# Patient Record
Sex: MALE | Race: BLACK OR AFRICAN AMERICAN | Employment: OTHER | URBAN - METROPOLITAN AREA
[De-identification: names, ages, dates, MRNs, and addresses within clinical notes are randomized per-mention and may not be internally consistent; named-entity substitution may affect disease eponyms.]

---

## 2017-03-16 ENCOUNTER — OFFICE VISIT (OUTPATIENT)
Dept: INTERNAL MEDICINE | Age: 27
End: 2017-03-16
Payer: MEDICARE

## 2017-03-16 VITALS
WEIGHT: 315 LBS | OXYGEN SATURATION: 94 % | HEIGHT: 74 IN | SYSTOLIC BLOOD PRESSURE: 155 MMHG | DIASTOLIC BLOOD PRESSURE: 94 MMHG | BODY MASS INDEX: 40.43 KG/M2 | HEART RATE: 75 BPM

## 2017-03-16 DIAGNOSIS — I10 ESSENTIAL HYPERTENSION: Primary | ICD-10-CM

## 2017-03-16 DIAGNOSIS — Z00.00 HEALTHCARE MAINTENANCE: ICD-10-CM

## 2017-03-16 PROCEDURE — 99204 OFFICE O/P NEW MOD 45 MIN: CPT | Performed by: NURSE PRACTITIONER

## 2017-03-16 RX ORDER — AMLODIPINE BESYLATE 5 MG/1
5 TABLET ORAL DAILY
Qty: 30 TABLET | Refills: 3 | Status: SHIPPED | OUTPATIENT
Start: 2017-03-16 | End: 2019-02-27 | Stop reason: SDUPTHER

## 2017-03-16 RX ORDER — AMLODIPINE BESYLATE 5 MG/1
5 TABLET ORAL DAILY
Qty: 30 TABLET | Refills: 3 | Status: SHIPPED | OUTPATIENT
Start: 2017-03-16 | End: 2017-03-16 | Stop reason: CLARIF

## 2017-03-16 ASSESSMENT — ENCOUNTER SYMPTOMS
SHORTNESS OF BREATH: 0
BLURRED VISION: 1
NAUSEA: 0
ORTHOPNEA: 0
ABDOMINAL PAIN: 0
SINUS PRESSURE: 0

## 2017-03-16 ASSESSMENT — PATIENT HEALTH QUESTIONNAIRE - PHQ9
2. FEELING DOWN, DEPRESSED OR HOPELESS: 0
1. LITTLE INTEREST OR PLEASURE IN DOING THINGS: 0
SUM OF ALL RESPONSES TO PHQ QUESTIONS 1-9: 0
SUM OF ALL RESPONSES TO PHQ9 QUESTIONS 1 & 2: 0

## 2017-04-13 ENCOUNTER — TELEPHONE (OUTPATIENT)
Dept: INTERNAL MEDICINE | Age: 27
End: 2017-04-13

## 2019-02-27 ENCOUNTER — OFFICE VISIT (OUTPATIENT)
Dept: PRIMARY CARE CLINIC | Age: 29
End: 2019-02-27
Payer: MEDICARE

## 2019-02-27 ENCOUNTER — HOSPITAL ENCOUNTER (OUTPATIENT)
Age: 29
Discharge: HOME | End: 2019-02-27
Payer: MEDICARE

## 2019-02-27 VITALS
SYSTOLIC BLOOD PRESSURE: 151 MMHG | DIASTOLIC BLOOD PRESSURE: 104 MMHG | HEART RATE: 83 BPM | TEMPERATURE: 97.5 F | OXYGEN SATURATION: 99 % | HEIGHT: 74 IN | BODY MASS INDEX: 40.43 KG/M2 | WEIGHT: 315 LBS

## 2019-02-27 DIAGNOSIS — G62.9 NEUROPATHY: Primary | ICD-10-CM

## 2019-02-27 DIAGNOSIS — I10 ESSENTIAL HYPERTENSION: ICD-10-CM

## 2019-02-27 DIAGNOSIS — G47.19 EXCESSIVE DAYTIME SLEEPINESS: ICD-10-CM

## 2019-02-27 DIAGNOSIS — E66.01 MORBID OBESITY WITH BMI OF 60.0-69.9, ADULT (HCC): ICD-10-CM

## 2019-02-27 DIAGNOSIS — Z11.4 SCREENING FOR HIV (HUMAN IMMUNODEFICIENCY VIRUS): ICD-10-CM

## 2019-02-27 DIAGNOSIS — G62.9 NEUROPATHY: ICD-10-CM

## 2019-02-27 LAB
ALBUMIN SERPL-MCNC: 4.1 G/DL (ref 3.5–5.2)
ALBUMIN/GLOBULIN RATIO: 1 (ref 1–2.5)
ALP BLD-CCNC: 72 U/L (ref 40–129)
ALT SERPL-CCNC: 28 U/L (ref 5–41)
ANION GAP SERPL CALCULATED.3IONS-SCNC: 11 MMOL/L (ref 9–17)
AST SERPL-CCNC: 29 U/L
BILIRUB SERPL-MCNC: 0.61 MG/DL (ref 0.3–1.2)
BUN BLDV-MCNC: 10 MG/DL (ref 6–20)
BUN/CREAT BLD: ABNORMAL (ref 9–20)
CALCIUM SERPL-MCNC: 9 MG/DL (ref 8.6–10.4)
CHLORIDE BLD-SCNC: 107 MMOL/L (ref 98–107)
CO2: 26 MMOL/L (ref 20–31)
CREAT SERPL-MCNC: 0.64 MG/DL (ref 0.7–1.2)
GFR AFRICAN AMERICAN: >60 ML/MIN
GFR NON-AFRICAN AMERICAN: >60 ML/MIN
GFR SERPL CREATININE-BSD FRML MDRD: ABNORMAL ML/MIN/{1.73_M2}
GFR SERPL CREATININE-BSD FRML MDRD: ABNORMAL ML/MIN/{1.73_M2}
GLUCOSE FASTING: 102 MG/DL (ref 70–99)
HBA1C MFR BLD: 5.8 %
HIV AG/AB: NONREACTIVE
POTASSIUM SERPL-SCNC: 4.2 MMOL/L (ref 3.7–5.3)
SODIUM BLD-SCNC: 144 MMOL/L (ref 135–144)
TOTAL PROTEIN: 8.1 G/DL (ref 6.4–8.3)

## 2019-02-27 PROCEDURE — 36415 COLL VENOUS BLD VENIPUNCTURE: CPT

## 2019-02-27 PROCEDURE — 87389 HIV-1 AG W/HIV-1&-2 AB AG IA: CPT

## 2019-02-27 PROCEDURE — 99214 OFFICE O/P EST MOD 30 MIN: CPT | Performed by: NURSE PRACTITIONER

## 2019-02-27 PROCEDURE — G8427 DOCREV CUR MEDS BY ELIG CLIN: HCPCS | Performed by: NURSE PRACTITIONER

## 2019-02-27 PROCEDURE — 1036F TOBACCO NON-USER: CPT | Performed by: NURSE PRACTITIONER

## 2019-02-27 PROCEDURE — 80053 COMPREHEN METABOLIC PANEL: CPT

## 2019-02-27 PROCEDURE — 83036 HEMOGLOBIN GLYCOSYLATED A1C: CPT | Performed by: NURSE PRACTITIONER

## 2019-02-27 PROCEDURE — G8484 FLU IMMUNIZE NO ADMIN: HCPCS | Performed by: NURSE PRACTITIONER

## 2019-02-27 PROCEDURE — G8417 CALC BMI ABV UP PARAM F/U: HCPCS | Performed by: NURSE PRACTITIONER

## 2019-02-27 RX ORDER — AMLODIPINE BESYLATE 5 MG/1
5 TABLET ORAL DAILY
Qty: 30 TABLET | Refills: 1 | Status: SHIPPED | OUTPATIENT
Start: 2019-02-27 | End: 2019-03-27

## 2019-02-27 ASSESSMENT — ENCOUNTER SYMPTOMS
ABDOMINAL PAIN: 0
COUGH: 0
VOMITING: 0
ORTHOPNEA: 0
BLURRED VISION: 0
APNEA: 0
TROUBLE SWALLOWING: 0
SHORTNESS OF BREATH: 0
BLOOD IN STOOL: 0
DIARRHEA: 0
WHEEZING: 0

## 2019-02-27 ASSESSMENT — PATIENT HEALTH QUESTIONNAIRE - PHQ9
SUM OF ALL RESPONSES TO PHQ9 QUESTIONS 1 & 2: 0
SUM OF ALL RESPONSES TO PHQ QUESTIONS 1-9: 0
2. FEELING DOWN, DEPRESSED OR HOPELESS: 0
SUM OF ALL RESPONSES TO PHQ QUESTIONS 1-9: 0
1. LITTLE INTEREST OR PLEASURE IN DOING THINGS: 0

## 2019-03-27 ENCOUNTER — OFFICE VISIT (OUTPATIENT)
Dept: PRIMARY CARE CLINIC | Age: 29
End: 2019-03-27
Payer: MEDICARE

## 2019-03-27 VITALS
WEIGHT: 315 LBS | HEART RATE: 76 BPM | DIASTOLIC BLOOD PRESSURE: 93 MMHG | SYSTOLIC BLOOD PRESSURE: 143 MMHG | BODY MASS INDEX: 62.4 KG/M2 | OXYGEN SATURATION: 97 % | TEMPERATURE: 98.2 F

## 2019-03-27 DIAGNOSIS — I10 ESSENTIAL HYPERTENSION: Primary | ICD-10-CM

## 2019-03-27 DIAGNOSIS — E66.01 MORBID OBESITY WITH BMI OF 60.0-69.9, ADULT (HCC): ICD-10-CM

## 2019-03-27 PROCEDURE — G8484 FLU IMMUNIZE NO ADMIN: HCPCS | Performed by: NURSE PRACTITIONER

## 2019-03-27 PROCEDURE — G8427 DOCREV CUR MEDS BY ELIG CLIN: HCPCS | Performed by: NURSE PRACTITIONER

## 2019-03-27 PROCEDURE — 99213 OFFICE O/P EST LOW 20 MIN: CPT | Performed by: NURSE PRACTITIONER

## 2019-03-27 PROCEDURE — 1036F TOBACCO NON-USER: CPT | Performed by: NURSE PRACTITIONER

## 2019-03-27 PROCEDURE — G8417 CALC BMI ABV UP PARAM F/U: HCPCS | Performed by: NURSE PRACTITIONER

## 2019-03-27 RX ORDER — AMLODIPINE BESYLATE 10 MG/1
10 TABLET ORAL DAILY
Qty: 90 TABLET | Refills: 1 | Status: SHIPPED | OUTPATIENT
Start: 2019-03-27 | End: 2020-09-08

## 2019-03-27 ASSESSMENT — ENCOUNTER SYMPTOMS
ORTHOPNEA: 0
BLURRED VISION: 0
SHORTNESS OF BREATH: 0

## 2019-07-26 ENCOUNTER — TELEPHONE (OUTPATIENT)
Dept: PRIMARY CARE CLINIC | Age: 29
End: 2019-07-26

## 2019-07-26 ENCOUNTER — OFFICE VISIT (OUTPATIENT)
Dept: PRIMARY CARE CLINIC | Age: 29
End: 2019-07-26
Payer: MEDICARE

## 2019-07-26 VITALS
BODY MASS INDEX: 61.89 KG/M2 | WEIGHT: 315 LBS | HEART RATE: 67 BPM | SYSTOLIC BLOOD PRESSURE: 133 MMHG | DIASTOLIC BLOOD PRESSURE: 91 MMHG | OXYGEN SATURATION: 97 %

## 2019-07-26 DIAGNOSIS — H66.001 NON-RECURRENT ACUTE SUPPURATIVE OTITIS MEDIA OF RIGHT EAR WITHOUT SPONTANEOUS RUPTURE OF TYMPANIC MEMBRANE: Primary | ICD-10-CM

## 2019-07-26 DIAGNOSIS — H60.311 DIFFUSE OTITIS EXTERNA OF RIGHT EAR, UNSPECIFIED CHRONICITY: ICD-10-CM

## 2019-07-26 PROCEDURE — G8427 DOCREV CUR MEDS BY ELIG CLIN: HCPCS | Performed by: NURSE PRACTITIONER

## 2019-07-26 PROCEDURE — 1036F TOBACCO NON-USER: CPT | Performed by: NURSE PRACTITIONER

## 2019-07-26 PROCEDURE — 99213 OFFICE O/P EST LOW 20 MIN: CPT | Performed by: NURSE PRACTITIONER

## 2019-07-26 PROCEDURE — 4130F TOPICAL PREP RX AOE: CPT | Performed by: NURSE PRACTITIONER

## 2019-07-26 PROCEDURE — G8417 CALC BMI ABV UP PARAM F/U: HCPCS | Performed by: NURSE PRACTITIONER

## 2019-07-26 RX ORDER — AMOXICILLIN AND CLAVULANATE POTASSIUM 875; 125 MG/1; MG/1
1 TABLET, FILM COATED ORAL 2 TIMES DAILY
Qty: 14 TABLET | Refills: 0 | Status: SHIPPED | OUTPATIENT
Start: 2019-07-26 | End: 2019-07-26

## 2019-07-26 RX ORDER — AMOXICILLIN 400 MG/5ML
800 POWDER, FOR SUSPENSION ORAL 2 TIMES DAILY
Qty: 200 ML | Refills: 0 | Status: SHIPPED | OUTPATIENT
Start: 2019-07-26 | End: 2019-08-05

## 2019-07-26 RX ORDER — OFLOXACIN 3 MG/ML
5 SOLUTION AURICULAR (OTIC) 2 TIMES DAILY
Qty: 10 ML | Refills: 0 | Status: SHIPPED | OUTPATIENT
Start: 2019-07-26 | End: 2019-07-31

## 2019-07-26 ASSESSMENT — ENCOUNTER SYMPTOMS
EYE DISCHARGE: 0
COUGH: 0
SHORTNESS OF BREATH: 0
EYE ITCHING: 0
SINUS PAIN: 0
SORE THROAT: 0

## 2019-07-26 NOTE — PROGRESS NOTES
[facility] [address] [facility]  [facility] [address]  Dept: [phone]  Dept Fax: [phone]    Ruby Eng is a 33 y.o. male who presents to the urgent care today for his medicalconditions/complaints as noted below. Ruby Eng is c/o of Otalgia (right side for about 2 days)      HPI:       28-year-old male patient presents with complaint of patient describes symptoms started approximately 3 to 4 days ago and have been gradually worsening. Patient describes the pain as dull, aching, feeling clogged. Patient reports he did go swimming last week. Denies pain with traction of the ear. Denies any discharge or bleeding. Additionally patient denies congestion, sore throat, sinus pain, cough. Denies fevers, chills. Relieving factors include none. Worsening factors include none. Treatments tried include none. Past Medical History:   Diagnosis Date    Hypertension 2017    Obesity         Current Outpatient Medications   Medication Sig Dispense Refill    ofloxacin (FLOXIN) 0.3 % otic solution Place 5 drops into the right ear 2 times daily for 5 days 10 mL 0    amoxicillin-clavulanate (AUGMENTIN) 875-125 MG per tablet Take 1 tablet by mouth 2 times daily for 7 days 14 tablet 0    amLODIPine (NORVASC) 10 MG tablet Take 1 tablet by mouth daily 90 tablet 1     No current facility-administered medications for this visit. No Known Allergies    Subjective:      Review of Systems   Constitutional: Negative for chills and fever. HENT: Positive for ear pain. Negative for congestion, ear discharge, sinus pain and sore throat. Eyes: Negative for discharge and itching. Respiratory: Negative for cough and shortness of breath. All other systems reviewed and are negative. Objective:     Physical Exam   Constitutional: He is oriented to person, place, and time. He appears well-developed and well-nourished.    HENT:   Head: Atraumatic. Right Ear: There is drainage and swelling. Tympanic membrane is injected and erythematous. A middle ear effusion is present. Left Ear: Ear canal normal. Tympanic membrane is injected. Nose: No mucosal edema or rhinorrhea. Right sinus exhibits no maxillary sinus tenderness and no frontal sinus tenderness. Left sinus exhibits no maxillary sinus tenderness and no frontal sinus tenderness. Mouth/Throat: Uvula is midline and oropharynx is clear and moist.   Cardiovascular: Normal rate. Pulmonary/Chest: Effort normal and breath sounds normal.   Neurological: He is alert and oriented to person, place, and time. Skin: Skin is warm and dry. Psychiatric: His behavior is normal.   Nursing note and vitals reviewed. BP (!) 133/91 (Site: Right Lower Arm, Position: Sitting, Cuff Size: Large Adult)   Pulse 67   Wt (!) 482 lb (218.6 kg)   SpO2 97%   BMI 61.89 kg/m²   Lab Review   No visits with results within 2 Month(s) from this visit. Latest known visit with results is:   Hospital Outpatient Visit on 02/27/2019   Component Date Value    HIV Ag/Ab 02/27/2019 NONREACTIVE     Glucose, Fasting 02/27/2019 102*    BUN 02/27/2019 10     CREATININE 02/27/2019 0.64*    Bun/Cre Ratio 02/27/2019 NOT REPORTED     Calcium 02/27/2019 9.0     Sodium 02/27/2019 144     Potassium 02/27/2019 4.2     Chloride 02/27/2019 107     CO2 02/27/2019 26     Anion Gap 02/27/2019 11     Alkaline Phosphatase 02/27/2019 72     ALT 02/27/2019 28     AST 02/27/2019 29     Total Bilirubin 02/27/2019 0.61     Total Protein 02/27/2019 8.1     Alb 02/27/2019 4.1     Albumin/Globulin Ratio 02/27/2019 1.0     GFR Non- 02/27/2019 >60     GFR  02/27/2019 >60     GFR Comment 02/27/2019          GFR Staging 02/27/2019 NOT REPORTED        Assessment:       Diagnosis Orders   1.  Non-recurrent acute suppurative otitis media of right ear without spontaneous rupture of tympanic membrane

## 2019-07-26 NOTE — PATIENT INSTRUCTIONS

## 2020-09-08 PROBLEM — E66.813 CLASS 3 SEVERE OBESITY DUE TO EXCESS CALORIES WITH SERIOUS COMORBIDITY AND BODY MASS INDEX (BMI) OF 60.0 TO 69.9 IN ADULT: Status: ACTIVE | Noted: 2020-09-08

## 2020-09-08 PROBLEM — I10 ESSENTIAL HYPERTENSION: Status: ACTIVE | Noted: 2020-09-08

## 2020-09-08 PROBLEM — E66.01 CLASS 3 SEVERE OBESITY DUE TO EXCESS CALORIES WITH SERIOUS COMORBIDITY AND BODY MASS INDEX (BMI) OF 60.0 TO 69.9 IN ADULT (HCC): Status: ACTIVE | Noted: 2020-09-08

## 2022-10-25 ENCOUNTER — HOSPITAL ENCOUNTER (EMERGENCY)
Age: 32
Discharge: HOME | End: 2022-10-25
Attending: EMERGENCY MEDICINE
Payer: MEDICARE

## 2022-10-25 ENCOUNTER — APPOINTMENT (OUTPATIENT)
Dept: GENERAL RADIOLOGY | Age: 32
End: 2022-10-25
Payer: MEDICARE

## 2022-10-25 VITALS
HEART RATE: 71 BPM | OXYGEN SATURATION: 97 % | HEIGHT: 74 IN | TEMPERATURE: 98.2 F | DIASTOLIC BLOOD PRESSURE: 111 MMHG | RESPIRATION RATE: 16 BRPM | SYSTOLIC BLOOD PRESSURE: 174 MMHG | BODY MASS INDEX: 40.43 KG/M2 | WEIGHT: 315 LBS
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
## 2022-10-25 DIAGNOSIS — S83.91XA SPRAIN OF RIGHT LOWER LEG, INITIAL ENCOUNTER: ICD-10-CM

## 2022-10-25 PROCEDURE — 73610 X-RAY EXAM OF ANKLE: CPT

## 2022-10-25 PROCEDURE — 73630 X-RAY EXAM OF FOOT: CPT

## 2022-10-25 PROCEDURE — 99283 EMERGENCY DEPT VISIT LOW MDM: CPT

## 2022-10-25 PROCEDURE — 73590 X-RAY EXAM OF LOWER LEG: CPT

## 2022-10-25 ASSESSMENT — PAIN - FUNCTIONAL ASSESSMENT: PAIN_FUNCTIONAL_ASSESSMENT: 0-10

## 2022-10-25 ASSESSMENT — PAIN SCALES - GENERAL: PAINLEVEL_OUTOF10: 7
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
## 2022-10-25 NOTE — Clinical Note
Stephanie Flores was seen and treated in our emergency department on 10/25/2022. He may return to work on 10/26/2022. If you have any questions or concerns, please don't hesitate to call.       Johnnie Gusman MD
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
## 2022-11-19 ENCOUNTER — HOSPITAL ENCOUNTER (EMERGENCY)
Age: 32
Discharge: HOME | End: 2022-11-19
Attending: EMERGENCY MEDICINE
Payer: MEDICARE

## 2022-11-19 ENCOUNTER — APPOINTMENT (OUTPATIENT)
Dept: GENERAL RADIOLOGY | Age: 32
End: 2022-11-19
Payer: MEDICARE

## 2022-11-19 VITALS
HEIGHT: 74 IN | HEART RATE: 69 BPM | OXYGEN SATURATION: 97 % | TEMPERATURE: 98.1 F | DIASTOLIC BLOOD PRESSURE: 121 MMHG | SYSTOLIC BLOOD PRESSURE: 161 MMHG | WEIGHT: 315 LBS | BODY MASS INDEX: 40.43 KG/M2 | RESPIRATION RATE: 14 BRPM

## 2022-11-19 DIAGNOSIS — M54.50 ACUTE RIGHT-SIDED LOW BACK PAIN WITHOUT SCIATICA: Primary | ICD-10-CM

## 2022-11-19 LAB
BILIRUBIN URINE: NEGATIVE
COLOR: YELLOW
COMMENT UA: ABNORMAL
GLUCOSE URINE: NEGATIVE
KETONES, URINE: NEGATIVE
LEUKOCYTE ESTERASE, URINE: NEGATIVE
NITRITE, URINE: NEGATIVE
PH UA: 6 (ref 5–8)
PROTEIN UA: NEGATIVE
SPECIFIC GRAVITY UA: 1.04 (ref 1–1.03)
TURBIDITY: CLEAR
URINE HGB: NEGATIVE
UROBILINOGEN, URINE: NORMAL

## 2022-11-19 PROCEDURE — 72100 X-RAY EXAM L-S SPINE 2/3 VWS: CPT

## 2022-11-19 PROCEDURE — 81003 URINALYSIS AUTO W/O SCOPE: CPT

## 2022-11-19 PROCEDURE — 99284 EMERGENCY DEPT VISIT MOD MDM: CPT

## 2022-11-19 RX ORDER — ACETAMINOPHEN AND CODEINE PHOSPHATE 300; 30 MG/1; MG/1
1 TABLET ORAL EVERY 6 HOURS PRN
Qty: 20 TABLET | Refills: 0 | Status: SHIPPED | OUTPATIENT
Start: 2022-11-19 | End: 2022-11-24

## 2022-11-19 RX ORDER — CYCLOBENZAPRINE HCL 10 MG
10 TABLET ORAL EVERY 8 HOURS PRN
Qty: 20 TABLET | Refills: 0 | Status: SHIPPED | OUTPATIENT
Start: 2022-11-19 | End: 2022-11-30

## 2022-11-19 RX ORDER — IBUPROFEN 800 MG/1
800 TABLET ORAL EVERY 8 HOURS PRN
Qty: 20 TABLET | Refills: 0 | Status: SHIPPED | OUTPATIENT
Start: 2022-11-19

## 2022-11-19 ASSESSMENT — PAIN - FUNCTIONAL ASSESSMENT: PAIN_FUNCTIONAL_ASSESSMENT: 0-10

## 2022-11-19 ASSESSMENT — ENCOUNTER SYMPTOMS
BACK PAIN: 1
VOMITING: 0
COLOR CHANGE: 0
DIARRHEA: 0
SHORTNESS OF BREATH: 0
EYE REDNESS: 0
FACIAL SWELLING: 0
COUGH: 0
EYE DISCHARGE: 0
ABDOMINAL PAIN: 0
CONSTIPATION: 0

## 2022-11-19 ASSESSMENT — PAIN SCALES - GENERAL: PAINLEVEL_OUTOF10: 8

## 2022-11-19 NOTE — ED PROVIDER NOTES
[address] [place] [address] ED  EMERGENCY DEPARTMENT ENCOUNTER      Pt Name: Linsey Hackett  MRN: [id number]  [place] 1990  Date of evaluation: 11/19/2022  Provider: Devika Clark MD    [address] [place] [address]       Chief Complaint   Patient presents with    Back Pain    Hip Pain         HISTORY OF PRESENT ILLNESS  (Location/Symptom, Timing/Onset, Context/Setting, Quality, Duration, Modifying Factors, Severity.)   Linsey Hackett is a 33 y.o. male who presents to the emergency department for right lower back pain that goes into his groin area. No trauma and he has had this for several days. He states his urine smells strong. No hematuria or injury. He rated the pain as an 8. He admits he does not take his blood pressure medicine regularly and has not taken it for few days. Nursing Notes were reviewed. ALLERGIES     Patient has no known allergies. CURRENT MEDICATIONS       Previous Medications    ASPIRIN LOW DOSE 81 MG EC TABLET    TAKE 1 TABLET BY MOUTH EVERY DAY    METOPROLOL SUCCINATE (TOPROL XL) 50 MG EXTENDED RELEASE TABLET    TAKE 1 TABLET BY MOUTH EVERY DAY       PAST MEDICAL HISTORY         Diagnosis Date    Hypertension 2017    Obesity        SURGICAL HISTORY     History reviewed. No pertinent surgical history. FAMILY HISTORY           Problem Relation Age of Onset    High Blood Pressure Mother     Hypertension Maternal Grandmother     Diabetes Maternal Grandmother     Diabetes Paternal Grandmother     High Blood Pressure Paternal Grandmother      Family Status   Relation Name Status    Mother  Alive    MGM  (Not Specified)    PGM  (Not Specified)        SOCIAL HISTORY      reports that he has never smoked. He has never used smokeless tobacco. He reports that he does not drink alcohol and does not use drugs. REVIEW OF SYSTEMS    (2-9 systems for level 4, 10 or more for level 5)     Review of Systems   Constitutional:  Negative for chills, fatigue and fever.    HENT:  Negative for congestion, ear discharge and facial swelling. Eyes:  Negative for discharge and redness. Respiratory:  Negative for cough and shortness of breath. Cardiovascular:  Negative for chest pain. Gastrointestinal:  Negative for abdominal pain, constipation, diarrhea and vomiting. Genitourinary:  Negative for dysuria and hematuria. Musculoskeletal:  Positive for back pain. Negative for arthralgias. Skin:  Negative for color change and rash. Neurological:  Negative for syncope, numbness and headaches. Hematological:  Negative for adenopathy. Psychiatric/Behavioral:  Negative for confusion. The patient is not nervous/anxious. Except as noted above the remainder of the review of systems was reviewed and negative. PHYSICAL EXAM    (up to 7 for level 4, 8 or more for level 5)     Vitals:    11/19/22 0502   BP: (!) 161/121   Pulse: 69   Resp: 14   Temp: 98.1 °F (36.7 °C)   TempSrc: Oral   SpO2: 97%   Weight: (!) 400 lb (181.4 kg)   Height: 6' 2\" (1.88 m)       Physical Exam  Vitals reviewed. Constitutional:       General: He is not in acute distress. Appearance: He is well-developed. He is not diaphoretic. Comments: He is morbidly obese. HENT:      Head: Normocephalic and atraumatic. Eyes:      General: No scleral icterus. Right eye: No discharge. Left eye: No discharge. Cardiovascular:      Rate and Rhythm: Normal rate and regular rhythm. Pulmonary:      Effort: Pulmonary effort is normal. No respiratory distress. Breath sounds: Normal breath sounds. No stridor. No wheezing or rales. Abdominal:      General: There is no distension. Palpations: Abdomen is soft. Tenderness: There is no abdominal tenderness. Musculoskeletal:      Cervical back: Neck supple. Comments: Back has no bruise or rash. Lymphadenopathy:      Cervical: No cervical adenopathy. Skin:     General: Skin is warm and dry. Findings: No erythema or rash.    Neurological:      Mental Status: He is alert and oriented to person, place, and time. Psychiatric:         Behavior: Behavior normal.           DIAGNOSTIC RESULTS     EKG: All EKG's are interpreted by the Emergency Department Physician who either signs or Co-signs this chart in the absence of a cardiologist.    Not indicated    RADIOLOGY:   Non-plain film images such as CT, Ultrasound and MRI are read by the radiologist. Plain radiographic images are visualized and preliminarily interpreted by the emergency physician with the below findings:    Interpretation per the Radiologist below, if available at the time of this note:    XR LUMBAR SPINE (2-3 VIEWS)    Result Date: 11/19/2022  EXAMINATION: 3 XRAY VIEWS OF THE LUMBAR SPINE 11/19/2022 5:41 am COMPARISON: None. HISTORY: ORDERING SYSTEM PROVIDED HISTORY: Atraumatic pain TECHNOLOGIST PROVIDED HISTORY: Atraumatic pain Reason for Exam: back and hip pain FINDINGS: Lumbar vertebral bodies are normal in height and alignment. No evidence of fracture. Visualized sacrum is unremarkable. No significant degenerative changes. Unremarkable examination of the lumbar spine. LABS:  Labs Reviewed   URINALYSIS - Abnormal; Notable for the following components:       Result Value    Specific Gravity, UA 1.044 (*)     All other components within normal limits       All other labs were within normal range or not returned as of this dictation.     EMERGENCY DEPARTMENT COURSE and DIFFERENTIAL DIAGNOSIS/MDM:   Vitals:    Vitals:    11/19/22 0502   BP: (!) 161/121   Pulse: 69   Resp: 14   Temp: 98.1 °F (36.7 °C)   TempSrc: Oral   SpO2: 97%   Weight: (!) 400 lb (181.4 kg)   Height: 6' 2\" (1.88 m)       Orders Placed This Encounter   Medications    ibuprofen (ADVIL;MOTRIN) 800 MG tablet     Sig: Take 1 tablet by mouth every 8 hours as needed for Pain     Dispense:  20 tablet     Refill:  0    cyclobenzaprine (FLEXERIL) 10 MG tablet     Sig: Take 1 tablet by mouth every 8 hours as needed for Muscle spasms Dispense:  20 tablet     Refill:  0    acetaminophen-codeine (TYLENOL/CODEINE #3) 300-30 MG per tablet     Sig: Take 1 tablet by mouth every 6 hours as needed for Pain for up to 5 days. Dispense:  20 tablet     Refill:  0       Medical Decision Making: Urinalysis and x-rays are negative and he will be treated symptomatically. He understands that his blood pressure is high and he has plenty of his blood pressure medication at home and he was instructed to take it. Treatment diagnosis and follow-up were discussed with patient. CONSULTS:  None    PROCEDURES:  None    FINAL IMPRESSION      1. Acute right-sided low back pain without sciatica          DISPOSITION/PLAN   DISPOSITION Decision To Discharge 11/19/2022 06:33:41 AM      PATIENT REFERRED TO:   RAMSES Perry CNP  [place] [address] [facility]  [phone]      As needed    [facility] ED  [address] [facility]  [phone]    If symptoms worsen    DISCHARGE MEDICATIONS:     New Prescriptions    ACETAMINOPHEN-CODEINE (TYLENOL/CODEINE #3) 300-30 MG PER TABLET    Take 1 tablet by mouth every 6 hours as needed for Pain for up to 5 days. CYCLOBENZAPRINE (FLEXERIL) 10 MG TABLET    Take 1 tablet by mouth every 8 hours as needed for Muscle spasms    IBUPROFEN (ADVIL;MOTRIN) 800 MG TABLET    Take 1 tablet by mouth every 8 hours as needed for Pain       The care is provided during an unprecedented national emergency due to the novel coronavirus, COVID-19.     (Please note that portions of this note were completed with a voice recognition program.  Efforts were made to edit the dictations but occasionally words are mis-transcribed.)    Eliezer Pyle MD  Attending Emergency Physician            Eliezer Pyle MD  11/19/22 2171

## 2022-11-29 DIAGNOSIS — R52 PAIN: Primary | ICD-10-CM

## 2022-11-30 ENCOUNTER — OFFICE VISIT (OUTPATIENT)
Dept: ORTHOPEDIC SURGERY | Age: 32
End: 2022-11-30

## 2022-11-30 VITALS — BODY MASS INDEX: 40.43 KG/M2 | HEIGHT: 74 IN | WEIGHT: 315 LBS

## 2022-11-30 DIAGNOSIS — M54.10 RADICULAR PAIN OF RIGHT LOWER EXTREMITY: Primary | ICD-10-CM

## 2022-11-30 RX ORDER — NAPROXEN 500 MG/1
500 TABLET ORAL 2 TIMES DAILY WITH MEALS
COMMUNITY
Start: 2022-11-23 | End: 2022-12-03

## 2022-11-30 RX ORDER — METHYLPREDNISOLONE 4 MG/1
TABLET ORAL
Qty: 1 KIT | Refills: 0 | Status: SHIPPED | OUTPATIENT
Start: 2022-11-30 | End: 2022-12-06

## 2022-11-30 RX ORDER — CYCLOBENZAPRINE HCL 10 MG
10 TABLET ORAL 3 TIMES DAILY PRN
Qty: 30 TABLET | Refills: 0 | Status: SHIPPED | OUTPATIENT
Start: 2022-11-30 | End: 2022-12-10
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
## 2023-04-12 PROBLEM — R31.0 GROSS HEMATURIA: Status: ACTIVE | Noted: 2019-11-18

## 2023-07-26 DIAGNOSIS — I10 ESSENTIAL HYPERTENSION: ICD-10-CM

## 2023-07-26 RX ORDER — METOPROLOL SUCCINATE 50 MG/1
TABLET, EXTENDED RELEASE ORAL
Qty: 30 TABLET | Refills: 2 | Status: SHIPPED | OUTPATIENT
Start: 2023-07-26

## 2023-10-12 ENCOUNTER — OFFICE VISIT (OUTPATIENT)
Dept: PRIMARY CARE CLINIC | Age: 33
End: 2023-10-12
Payer: MEDICAID

## 2023-10-12 ENCOUNTER — HOSPITAL ENCOUNTER (OUTPATIENT)
Age: 33
Setting detail: SPECIMEN
Discharge: HOME | End: 2023-10-12

## 2023-10-12 VITALS
HEIGHT: 73 IN | WEIGHT: 315 LBS | RESPIRATION RATE: 20 BRPM | HEART RATE: 81 BPM | OXYGEN SATURATION: 96 % | BODY MASS INDEX: 41.75 KG/M2 | DIASTOLIC BLOOD PRESSURE: 98 MMHG | SYSTOLIC BLOOD PRESSURE: 142 MMHG

## 2023-10-12 DIAGNOSIS — Z11.3 SCREEN FOR STD (SEXUALLY TRANSMITTED DISEASE): ICD-10-CM

## 2023-10-12 DIAGNOSIS — I10 ESSENTIAL HYPERTENSION: ICD-10-CM

## 2023-10-12 DIAGNOSIS — Z00.00 ENCOUNTER FOR GENERAL ADULT MEDICAL EXAMINATION W/O ABNORMAL FINDINGS: Primary | ICD-10-CM

## 2023-10-12 LAB
HCV AB SERPL QL IA: NONREACTIVE
HIV 1+2 AB+HIV1 P24 AG SERPL QL IA: NONREACTIVE

## 2023-10-12 PROCEDURE — 3077F SYST BP >= 140 MM HG: CPT | Performed by: NURSE PRACTITIONER

## 2023-10-12 PROCEDURE — 3080F DIAST BP >= 90 MM HG: CPT | Performed by: NURSE PRACTITIONER

## 2023-10-12 PROCEDURE — 99395 PREV VISIT EST AGE 18-39: CPT | Performed by: NURSE PRACTITIONER

## 2023-10-12 RX ORDER — METOPROLOL SUCCINATE 100 MG/1
100 TABLET, EXTENDED RELEASE ORAL DAILY
Qty: 90 TABLET | Refills: 1 | Status: SHIPPED | OUTPATIENT
Start: 2023-10-12

## 2023-10-12 RX ORDER — METOPROLOL SUCCINATE 50 MG/1
50 TABLET, EXTENDED RELEASE ORAL DAILY
Qty: 30 TABLET | Refills: 2 | Status: SHIPPED | OUTPATIENT
Start: 2023-10-12 | End: 2023-10-12

## 2023-10-12 ASSESSMENT — PATIENT HEALTH QUESTIONNAIRE - PHQ9
SUM OF ALL RESPONSES TO PHQ QUESTIONS 1-9: 0
SUM OF ALL RESPONSES TO PHQ9 QUESTIONS 1 & 2: 0
2. FEELING DOWN, DEPRESSED OR HOPELESS: 0
1. LITTLE INTEREST OR PLEASURE IN DOING THINGS: 0
SUM OF ALL RESPONSES TO PHQ QUESTIONS 1-9: 0

## 2023-10-12 ASSESSMENT — ENCOUNTER SYMPTOMS
COUGH: 0
BACK PAIN: 0
ABDOMINAL PAIN: 0
SHORTNESS OF BREATH: 0

## 2023-10-12 NOTE — PROGRESS NOTES
[address] [facility]  [address] [address]  Dept: [phone]  Dept Fax: [phone]    Dao Thakur is a 29 y.o. male who presentstoday for his medical conditions/complaints as noted below. Dao Thakur is c/o of  Chief Complaint   Patient presents with    Annual Exam         HPI:     Presents for annual exam  BP elevated- Repeat 142/98  Pt is compliant with med. Has missed last 4 days  Denies any side effects from taking it  HR 81  Has gained 7lb since [address] [place] [address]     Annual labs up to date    Pt had a migraine Sunday that lasted a couple of days  This is the first one that happened   Excedrin helped relieve it. Would like labs for STD testing    Denies any other problems/concerns             Hemoglobin A1C (%)   Date Value   04/12/2023 5.5   09/08/2020 5.7   02/27/2019 5.8             ( goal A1C is < 7)   No components found for: \"LABMICR\"  LDL Cholesterol (mg/dL)   Date Value   04/12/2023 143 (H)       (goal LDL is <100)   AST (U/L)   Date Value   02/27/2019 29     ALT (U/L)   Date Value   02/27/2019 28     BUN (mg/dL)   Date Value   02/27/2019 10     BP Readings from Last 3 Encounters:   10/12/23 (!) 142/98   04/12/23 130/84   11/19/22 (!) 161/121          ([id number])    Past Medical History:   Diagnosis Date    Anxiety     Hypertension 2017    Obesity       History reviewed. No pertinent surgical history.     Family History   Problem Relation Age of Onset    High Blood Pressure Mother     Hypertension Maternal Grandmother     Diabetes Maternal Grandmother     Diabetes Paternal Grandmother     High Blood Pressure Paternal Grandmother           Social History     Tobacco Use    Smoking status: Never    Smokeless tobacco: Never   Substance Use Topics    Alcohol use: No      Current Outpatient Medications   Medication Sig Dispense Refill    metoprolol succinate (TOPROL XL) 100 MG extended release tablet Take 1 tablet by mouth daily 90 tablet

## 2023-10-13 LAB
CHLAMYDIA DNA UR QL NAA+PROBE: NEGATIVE
N GONORRHOEA DNA UR QL NAA+PROBE: NEGATIVE
SPECIMEN DESCRIPTION: NORMAL

## 2023-10-17 LAB
HSV1 IGG SERPL QL IA: 24.82
HSV1+2 IGM SER QL IA: 0.47
HSV2 AB SER QL IA: 0.68

## 2024-04-14 DIAGNOSIS — I10 ESSENTIAL HYPERTENSION: ICD-10-CM

## 2024-04-15 RX ORDER — METOPROLOL SUCCINATE 100 MG/1
100 TABLET, EXTENDED RELEASE ORAL DAILY
Qty: 90 TABLET | Refills: 1 | Status: SHIPPED | OUTPATIENT
Start: 2024-04-15

## 2024-05-07 ASSESSMENT — PATIENT HEALTH QUESTIONNAIRE - PHQ9
2. FEELING DOWN, DEPRESSED OR HOPELESS: SEVERAL DAYS
SUM OF ALL RESPONSES TO PHQ QUESTIONS 1-9: 1
2. FEELING DOWN, DEPRESSED OR HOPELESS: SEVERAL DAYS
1. LITTLE INTEREST OR PLEASURE IN DOING THINGS: NOT AT ALL
1. LITTLE INTEREST OR PLEASURE IN DOING THINGS: NOT AT ALL
SUM OF ALL RESPONSES TO PHQ9 QUESTIONS 1 & 2: 1
SUM OF ALL RESPONSES TO PHQ9 QUESTIONS 1 & 2: 1
SUM OF ALL RESPONSES TO PHQ QUESTIONS 1-9: 1

## 2024-05-10 ENCOUNTER — OFFICE VISIT (OUTPATIENT)
Dept: PRIMARY CARE CLINIC | Age: 34
End: 2024-05-10
Payer: MEDICAID

## 2024-05-10 VITALS
RESPIRATION RATE: 16 BRPM | DIASTOLIC BLOOD PRESSURE: 96 MMHG | HEIGHT: 74 IN | BODY MASS INDEX: 61.29 KG/M2 | OXYGEN SATURATION: 96 % | HEART RATE: 72 BPM | SYSTOLIC BLOOD PRESSURE: 146 MMHG

## 2024-05-10 DIAGNOSIS — Z13.29 SCREENING FOR THYROID DISORDER: ICD-10-CM

## 2024-05-10 DIAGNOSIS — Z13.220 SCREENING FOR LIPID DISORDERS: ICD-10-CM

## 2024-05-10 DIAGNOSIS — E66.01 CLASS 3 SEVERE OBESITY DUE TO EXCESS CALORIES WITH SERIOUS COMORBIDITY AND BODY MASS INDEX (BMI) OF 60.0 TO 69.9 IN ADULT (HCC): ICD-10-CM

## 2024-05-10 DIAGNOSIS — I10 ESSENTIAL HYPERTENSION: Primary | ICD-10-CM

## 2024-05-10 DIAGNOSIS — Z13.0 SCREENING FOR DEFICIENCY ANEMIA: ICD-10-CM

## 2024-05-10 DIAGNOSIS — Z13.1 SCREENING FOR DIABETES MELLITUS: ICD-10-CM

## 2024-05-10 PROCEDURE — 3077F SYST BP >= 140 MM HG: CPT | Performed by: NURSE PRACTITIONER

## 2024-05-10 PROCEDURE — 3080F DIAST BP >= 90 MM HG: CPT | Performed by: NURSE PRACTITIONER

## 2024-05-10 PROCEDURE — 99214 OFFICE O/P EST MOD 30 MIN: CPT | Performed by: NURSE PRACTITIONER

## 2024-05-10 RX ORDER — METOPROLOL SUCCINATE 100 MG/1
100 TABLET, EXTENDED RELEASE ORAL DAILY
Qty: 90 TABLET | Refills: 1 | Status: SHIPPED | OUTPATIENT
Start: 2024-05-10

## 2024-05-10 RX ORDER — BLOOD PRESSURE TEST KIT
1 KIT MISCELLANEOUS DAILY
Qty: 1 KIT | Refills: 0 | Status: SHIPPED | OUTPATIENT
Start: 2024-05-10

## 2024-05-10 SDOH — ECONOMIC STABILITY: FOOD INSECURITY: WITHIN THE PAST 12 MONTHS, YOU WORRIED THAT YOUR FOOD WOULD RUN OUT BEFORE YOU GOT MONEY TO BUY MORE.: NEVER TRUE

## 2024-05-10 SDOH — ECONOMIC STABILITY: HOUSING INSECURITY
IN THE LAST 12 MONTHS, WAS THERE A TIME WHEN YOU DID NOT HAVE A STEADY PLACE TO SLEEP OR SLEPT IN A SHELTER (INCLUDING NOW)?: NO

## 2024-05-10 SDOH — ECONOMIC STABILITY: INCOME INSECURITY: HOW HARD IS IT FOR YOU TO PAY FOR THE VERY BASICS LIKE FOOD, HOUSING, MEDICAL CARE, AND HEATING?: NOT HARD AT ALL

## 2024-05-10 SDOH — ECONOMIC STABILITY: FOOD INSECURITY: WITHIN THE PAST 12 MONTHS, THE FOOD YOU BOUGHT JUST DIDN'T LAST AND YOU DIDN'T HAVE MONEY TO GET MORE.: NEVER TRUE

## 2024-05-10 ASSESSMENT — ENCOUNTER SYMPTOMS
SHORTNESS OF BREATH: 0
ABDOMINAL PAIN: 0
BACK PAIN: 0
COUGH: 0

## 2024-05-10 NOTE — PROGRESS NOTES
[facility]  [facility]  [address] [place] [address] [address]  [address]  [facility] [address]  Dept: [phone]  Dept Fax: [phone]    Julian Menezes is a 33 y.o. male who presentstoday for his medical conditions/complaints as noted below.  Julian Menezes is c/o of  Chief Complaint   Patient presents with    Hypertension         HPI:     Presents for 6 month recheck  BP elevated, improves with rest  Asymptomatic  Has not taken his BP meds today  Unable to get current weight per scale    Would like to talk about weight loss options  Would prefer injectable medication if covered  Willing to meet with bariatrics    Willing to update annual labs    Needs printed rx for BP kit, will use DME    Denies any other problems/concerns        Hemoglobin A1C (%)   Date Value   04/12/2023 5.5   09/08/2020 5.7   02/27/2019 5.8             ( goal A1C is < 7)   No components found for: \"LABMICR\"  No components found for: \"LDLCHOLESTEROL\", \"LDLCALC\"    (goal LDL is <100)   AST (U/L)   Date Value   02/27/2019 29     ALT (U/L)   Date Value   02/27/2019 28     BUN (mg/dL)   Date Value   02/27/2019 10     BP Readings from Last 3 Encounters:   05/10/24 (!) 146/96   10/12/23 (!) 142/98   04/12/23 130/84          ([id number])    Past Medical History:   Diagnosis Date    Anxiety     Hypertension 2017    Obesity       History reviewed. No pertinent surgical history.    Family History   Problem Relation Age of Onset    High Blood Pressure Mother     Hypertension Maternal Grandmother     Diabetes Maternal Grandmother     Diabetes Paternal Grandmother     High Blood Pressure Paternal Grandmother           Social History     Tobacco Use    Smoking status: Never    Smokeless tobacco: Never   Substance Use Topics    Alcohol use: No      Current Outpatient Medications   Medication Sig Dispense Refill    metoprolol succinate (TOPROL XL) 100 MG extended release tablet Take 1 tablet by mouth daily 90 tablet 1    Blood Pressure

## 2024-10-08 ENCOUNTER — OFFICE VISIT (OUTPATIENT)
Dept: BARIATRICS/WEIGHT MGMT | Age: 34
End: 2024-10-08
Payer: MEDICAID

## 2024-10-08 VITALS
OXYGEN SATURATION: 98 % | DIASTOLIC BLOOD PRESSURE: 110 MMHG | WEIGHT: 315 LBS | SYSTOLIC BLOOD PRESSURE: 160 MMHG | HEART RATE: 78 BPM | BODY MASS INDEX: 42.66 KG/M2 | HEIGHT: 72 IN

## 2024-10-08 DIAGNOSIS — E66.01 CLASS 3 SEVERE OBESITY DUE TO EXCESS CALORIES WITH SERIOUS COMORBIDITY AND BODY MASS INDEX (BMI) OF 60.0 TO 69.9 IN ADULT: ICD-10-CM

## 2024-10-08 DIAGNOSIS — E66.813 CLASS 3 SEVERE OBESITY DUE TO EXCESS CALORIES WITH SERIOUS COMORBIDITY AND BODY MASS INDEX (BMI) OF 60.0 TO 69.9 IN ADULT: ICD-10-CM

## 2024-10-08 DIAGNOSIS — R73.03 PREDIABETES: ICD-10-CM

## 2024-10-08 DIAGNOSIS — R73.9 ELEVATED BLOOD SUGAR: ICD-10-CM

## 2024-10-08 DIAGNOSIS — G47.19 EXCESSIVE DAYTIME SLEEPINESS: ICD-10-CM

## 2024-10-08 DIAGNOSIS — R06.83 SNORING: Primary | ICD-10-CM

## 2024-10-08 DIAGNOSIS — I10 ESSENTIAL HYPERTENSION: ICD-10-CM

## 2024-10-08 PROCEDURE — 99215 OFFICE O/P EST HI 40 MIN: CPT | Performed by: NURSE PRACTITIONER

## 2024-10-08 PROCEDURE — 3080F DIAST BP >= 90 MM HG: CPT | Performed by: NURSE PRACTITIONER

## 2024-10-08 PROCEDURE — 3077F SYST BP >= 140 MM HG: CPT | Performed by: NURSE PRACTITIONER

## 2024-10-08 NOTE — PROGRESS NOTES
Mental Status: He is alert and oriented to person, place, and time.      Gait: Gait normal.   Psychiatric:         Mood and Affect: Mood normal.         Behavior: Behavior normal.         Thought Content: Thought content normal.         Judgment: Judgment normal.           Labs Review:     No recent labs. PCP ordered labs in May 2024 but not completed yet.   Assessment:      Obesity with BMI and comorbidities as noted above.  Contraindications to weight loss: none  Patient readiness to commit to diet and activity changes: fair  Barriers to weight loss: none         Diagnosis Orders   1. Snoring  Home Sleep Study      2. Excessive daytime sleepiness  Home Sleep Study      3. Prediabetes  Insulin, Total    Semaglutide-Weight Management (WEGOVY) 0.25 MG/0.5ML SOAJ SC injection      4. Elevated blood sugar  Insulin, Total      5. Class 3 severe obesity due to excess calories with serious comorbidity and body mass index (BMI) of 60.0 to 69.9 in adult  Semaglutide-Weight Management (WEGOVY) 0.25 MG/0.5ML SOAJ SC injection      6. Essential hypertension            Plan:       1. Diagnostic studies to rule out secondary causes of obesity:  see orders  Printed out PCP orders also so he can get all completed.   Home sleep study ordered    2. General patient education (‘Yes’ if discussed, ‘No’ if not)    Importance of long-term maintenance tx in weight loss: yes  Elicit support from others; identify saboteurs: not applicable    3. Diet interventions:     Increase protein to at least 60g per day  Increase water to at least 64oz per day  Eliminate sugary drinks  Eat high protein breakfast  Discussed importance of consistent meals and snacks, avoid skipping meals  Dicussed reducing portion sizes of carbohydrates, prioritize protein at each meals. Once protein is finished, focus on vegetables/fruit and then carbohydrates    Proper food choices reviewed: yes  Preparation techniques reviewed: yes  Careful meal planning;

## 2024-10-24 ENCOUNTER — HOSPITAL ENCOUNTER (OUTPATIENT)
Dept: SLEEP CENTER | Age: 34
Discharge: HOME | End: 2024-10-26
Payer: MEDICAID

## 2024-10-24 DIAGNOSIS — R06.83 SNORING: ICD-10-CM

## 2024-10-24 DIAGNOSIS — G47.19 EXCESSIVE DAYTIME SLEEPINESS: ICD-10-CM

## 2024-10-24 PROCEDURE — G0399 HOME SLEEP TEST/TYPE 3 PORTA: HCPCS

## 2024-11-05 LAB — STATUS: NORMAL

## 2024-11-06 DIAGNOSIS — G47.33 OSA (OBSTRUCTIVE SLEEP APNEA): Primary | ICD-10-CM

## 2024-11-18 ENCOUNTER — TELEPHONE (OUTPATIENT)
Dept: PRIMARY CARE CLINIC | Age: 34
End: 2024-11-18

## 2024-11-18 ENCOUNTER — HOSPITAL ENCOUNTER (OUTPATIENT)
Age: 34
Discharge: HOME | End: 2024-11-18
Payer: MEDICAID

## 2024-11-18 DIAGNOSIS — E88.819 INSULIN RESISTANCE: Primary | ICD-10-CM

## 2024-11-18 DIAGNOSIS — R73.9 ELEVATED BLOOD SUGAR: ICD-10-CM

## 2024-11-18 DIAGNOSIS — Z13.220 SCREENING FOR LIPID DISORDERS: ICD-10-CM

## 2024-11-18 DIAGNOSIS — Z13.0 SCREENING FOR DEFICIENCY ANEMIA: ICD-10-CM

## 2024-11-18 DIAGNOSIS — G47.33 OSA (OBSTRUCTIVE SLEEP APNEA): ICD-10-CM

## 2024-11-18 DIAGNOSIS — Z11.3 SCREEN FOR STD (SEXUALLY TRANSMITTED DISEASE): ICD-10-CM

## 2024-11-18 DIAGNOSIS — Z11.3 SCREEN FOR STD (SEXUALLY TRANSMITTED DISEASE): Primary | ICD-10-CM

## 2024-11-18 DIAGNOSIS — Z13.29 SCREENING FOR THYROID DISORDER: ICD-10-CM

## 2024-11-18 DIAGNOSIS — Z13.1 SCREENING FOR DIABETES MELLITUS: ICD-10-CM

## 2024-11-18 DIAGNOSIS — R73.03 PREDIABETES: ICD-10-CM

## 2024-11-18 LAB
ALBUMIN SERPL-MCNC: 4.5 G/DL (ref 3.5–5.2)
ALBUMIN/GLOB SERPL: 1.2 {RATIO} (ref 1–2.5)
ALP SERPL-CCNC: 72 U/L (ref 40–129)
ALT SERPL-CCNC: 31 U/L (ref 10–50)
ANION GAP SERPL CALCULATED.3IONS-SCNC: 13 MMOL/L (ref 9–16)
AST SERPL-CCNC: 44 U/L (ref 10–50)
BILIRUB SERPL-MCNC: 0.8 MG/DL (ref 0–1.2)
BUN SERPL-MCNC: 15 MG/DL (ref 6–20)
CALCIUM SERPL-MCNC: 9.5 MG/DL (ref 8.6–10.4)
CHLORIDE SERPL-SCNC: 103 MMOL/L (ref 98–107)
CHOLEST SERPL-MCNC: 201 MG/DL (ref 0–199)
CHOLESTEROL/HDL RATIO: 4
CO2 SERPL-SCNC: 24 MMOL/L (ref 20–31)
CREAT SERPL-MCNC: 1.2 MG/DL (ref 0.7–1.2)
ERYTHROCYTE [DISTWIDTH] IN BLOOD BY AUTOMATED COUNT: 13.1 % (ref 11.8–14.4)
EST. AVERAGE GLUCOSE BLD GHB EST-MCNC: 105 MG/DL
GFR, ESTIMATED: 82 ML/MIN/1.73M2
GLUCOSE SERPL-MCNC: 92 MG/DL (ref 74–99)
HBA1C MFR BLD: 5.3 % (ref 4–6)
HCT VFR BLD AUTO: 48 % (ref 40.7–50.3)
HCV AB SERPL QL IA: NONREACTIVE
HDLC SERPL-MCNC: 50 MG/DL
HGB BLD-MCNC: 15.6 G/DL (ref 13–17)
HIV 1+2 AB+HIV1 P24 AG SERPL QL IA: NONREACTIVE
INSULIN REFERENCE RANGE:: NORMAL
INSULIN: 45 MU/L
LDLC SERPL CALC-MCNC: 137 MG/DL (ref 0–100)
MCH RBC QN AUTO: 28.1 PG (ref 25.2–33.5)
MCHC RBC AUTO-ENTMCNC: 32.5 G/DL (ref 28.4–34.8)
MCV RBC AUTO: 86.5 FL (ref 82.6–102.9)
NRBC BLD-RTO: 0 PER 100 WBC
PLATELET # BLD AUTO: 273 K/UL (ref 138–453)
PMV BLD AUTO: 10.2 FL (ref 8.1–13.5)
POTASSIUM SERPL-SCNC: 4 MMOL/L (ref 3.7–5.3)
PROT SERPL-MCNC: 8.3 G/DL (ref 6.6–8.7)
RBC # BLD AUTO: 5.55 M/UL (ref 4.21–5.77)
SODIUM SERPL-SCNC: 140 MMOL/L (ref 136–145)
TRIGL SERPL-MCNC: 72 MG/DL
TSH SERPL DL<=0.05 MIU/L-ACNC: 3.83 UIU/ML (ref 0.27–4.2)
VLDLC SERPL CALC-MCNC: 14 MG/DL (ref 1–30)
WBC OTHER # BLD: 4.4 K/UL (ref 3.5–11.3)

## 2024-11-18 PROCEDURE — 87491 CHLMYD TRACH DNA AMP PROBE: CPT

## 2024-11-18 PROCEDURE — 83036 HEMOGLOBIN GLYCOSYLATED A1C: CPT

## 2024-11-18 PROCEDURE — 84443 ASSAY THYROID STIM HORMONE: CPT

## 2024-11-18 PROCEDURE — 86695 HERPES SIMPLEX TYPE 1 TEST: CPT

## 2024-11-18 PROCEDURE — 86696 HERPES SIMPLEX TYPE 2 TEST: CPT

## 2024-11-18 PROCEDURE — 85027 COMPLETE CBC AUTOMATED: CPT

## 2024-11-18 PROCEDURE — 87591 N.GONORRHOEAE DNA AMP PROB: CPT

## 2024-11-18 PROCEDURE — 86803 HEPATITIS C AB TEST: CPT

## 2024-11-18 PROCEDURE — 80053 COMPREHEN METABOLIC PANEL: CPT

## 2024-11-18 PROCEDURE — 80061 LIPID PANEL: CPT

## 2024-11-18 PROCEDURE — 86694 HERPES SIMPLEX NES ANTBDY: CPT

## 2024-11-18 PROCEDURE — 83525 ASSAY OF INSULIN: CPT

## 2024-11-18 PROCEDURE — 36415 COLL VENOUS BLD VENIPUNCTURE: CPT

## 2024-11-18 PROCEDURE — 87389 HIV-1 AG W/HIV-1&-2 AB AG IA: CPT

## 2024-11-18 RX ORDER — METFORMIN HYDROCHLORIDE 500 MG/1
500 TABLET, EXTENDED RELEASE ORAL
Qty: 30 TABLET | Refills: 3 | Status: SHIPPED | OUTPATIENT
Start: 2024-11-18

## 2024-11-18 NOTE — TELEPHONE ENCOUNTER
Patient called asking for STD testing to be added to his current labs he already has ordered, patient currently at [facility]'s labs and checked in.

## 2024-11-19 LAB
CHLAMYDIA DNA UR QL NAA+PROBE: NEGATIVE
HSV1 IGG SERPL QL IA: 24.34
HSV1+2 IGM SER QL IA: 0.35
HSV2 AB SER QL IA: 0.74
N GONORRHOEA DNA UR QL NAA+PROBE: NEGATIVE
SPECIMEN DESCRIPTION: NORMAL

## 2024-12-19 ENCOUNTER — TELEPHONE (OUTPATIENT)
Dept: PRIMARY CARE CLINIC | Age: 34
End: 2024-12-19

## 2025-05-05 DIAGNOSIS — I10 ESSENTIAL HYPERTENSION: ICD-10-CM

## 2025-05-05 RX ORDER — METOPROLOL SUCCINATE 100 MG/1
100 TABLET, EXTENDED RELEASE ORAL DAILY
Qty: 90 TABLET | Refills: 1 | OUTPATIENT
Start: 2025-05-05